# Patient Record
Sex: FEMALE | Race: ASIAN | NOT HISPANIC OR LATINO | URBAN - METROPOLITAN AREA
[De-identification: names, ages, dates, MRNs, and addresses within clinical notes are randomized per-mention and may not be internally consistent; named-entity substitution may affect disease eponyms.]

---

## 2018-06-08 ENCOUNTER — OUTPATIENT (OUTPATIENT)
Dept: OUTPATIENT SERVICES | Facility: HOSPITAL | Age: 78
LOS: 1 days | End: 2018-06-08
Payer: MEDICARE

## 2018-06-08 VITALS
DIASTOLIC BLOOD PRESSURE: 82 MMHG | WEIGHT: 108.91 LBS | OXYGEN SATURATION: 97 % | HEART RATE: 77 BPM | TEMPERATURE: 98 F | RESPIRATION RATE: 16 BRPM | SYSTOLIC BLOOD PRESSURE: 137 MMHG

## 2018-06-08 DIAGNOSIS — M65.332 TRIGGER FINGER, LEFT MIDDLE FINGER: ICD-10-CM

## 2018-06-08 DIAGNOSIS — Z01.818 ENCOUNTER FOR OTHER PREPROCEDURAL EXAMINATION: ICD-10-CM

## 2018-06-08 DIAGNOSIS — Z98.890 OTHER SPECIFIED POSTPROCEDURAL STATES: Chronic | ICD-10-CM

## 2018-06-08 DIAGNOSIS — Z98.891 HISTORY OF UTERINE SCAR FROM PREVIOUS SURGERY: Chronic | ICD-10-CM

## 2018-06-08 DIAGNOSIS — Z90.710 ACQUIRED ABSENCE OF BOTH CERVIX AND UTERUS: Chronic | ICD-10-CM

## 2018-06-08 DIAGNOSIS — Z90.49 ACQUIRED ABSENCE OF OTHER SPECIFIED PARTS OF DIGESTIVE TRACT: Chronic | ICD-10-CM

## 2018-06-08 PROCEDURE — G0463: CPT

## 2018-06-08 NOTE — H&P PST ADULT - PROBLEM SELECTOR PLAN 2
Medical clearance needed and received in chart. CBC, Comprehensive panel and EKG ordered. Pre-op instructions and surgical scrubs given and pt and pt's daughter verbalized understanding.

## 2018-06-08 NOTE — H&P PST ADULT - MUSCULOSKELETAL COMMENTS
Left hand middle finger - decreased ROM, (+) tenderness to palpation, (+) decreased strength, no edema, no erythema See HPI

## 2018-06-08 NOTE — H&P PST ADULT - PSH
History of colonoscopy    S/P appendectomy  ()  S/P  section  () History of colonoscopy    S/P appendectomy  ()  S/P  section  ()  S/P dilatation and curettage  (TOP, )  S/P hysterectomy  (TAHBSO, 1988)  S/P lumbar discectomy  ()  S/P trigger finger release  (Right middle finger, )

## 2018-06-08 NOTE — H&P PST ADULT - NSANTHOSAYNRD_GEN_A_CORE
No. SHILA screening performed.  STOP BANG Legend: 0-2 = LOW Risk; 3-4 = INTERMEDIATE Risk; 5-8 = HIGH Risk

## 2018-06-08 NOTE — H&P PST ADULT - NEGATIVE CARDIOVASCULAR SYMPTOMS
no claudication/no paroxysmal nocturnal dyspnea/no chest pain/no dyspnea on exertion/no palpitations/no peripheral edema/no orthopnea

## 2018-06-08 NOTE — H&P PST ADULT - HISTORY OF PRESENT ILLNESS
Pt complaining of left middle finger pain and "unable to fully stretch" for the last year. Pt has had cortisone injection x 3, last in 4/2018.  Pt rates left middle finger pain to be 7/10. 76yo female with PMH of HTN here for PST. Pt complaining of left middle finger pain and "unable to fully stretch" for the last year. Pt has had cortisone injection x 3, last in 4/2018.  Pt rates left middle finger pain to be 7/10 but pt denies taking any po analgesia. Pt denies left hand neuralgia. Pt electing for release left long trigger finger on 6/13/18. 78yo female with PMH of HTN here for PST. Pt complaining of left middle finger pain and "unable to fully stretch finger" for the last year. Pt has had cortisone injection x 3, last in 4/2018.  Pt rates left middle finger pain to be 7/10 but pt denies taking any po analgesia. Pt denies left hand neuralgia. Pt electing for release left long trigger finger on 6/13/18.

## 2018-06-08 NOTE — H&P PST ADULT - RS GEN PE MLT RESP DETAILS PC
normal/breath sounds equal/good air movement/respirations non-labored/clear to auscultation bilaterally/airway patent

## 2018-06-08 NOTE — H&P PST ADULT - SOURCE OF INFORMATION, PROFILE
patient family/Pt is mandarin speaking and refusing translation service,. Pt requesting daughter Toni to translate for her./patient

## 2018-06-12 ENCOUNTER — TRANSCRIPTION ENCOUNTER (OUTPATIENT)
Age: 78
End: 2018-06-12

## 2018-06-13 ENCOUNTER — OUTPATIENT (OUTPATIENT)
Dept: OUTPATIENT SERVICES | Facility: HOSPITAL | Age: 78
LOS: 1 days | End: 2018-06-13
Payer: MEDICARE

## 2018-06-13 VITALS
HEART RATE: 71 BPM | RESPIRATION RATE: 14 BRPM | DIASTOLIC BLOOD PRESSURE: 77 MMHG | SYSTOLIC BLOOD PRESSURE: 134 MMHG | OXYGEN SATURATION: 100 %

## 2018-06-13 VITALS
OXYGEN SATURATION: 100 % | TEMPERATURE: 98 F | SYSTOLIC BLOOD PRESSURE: 158 MMHG | HEART RATE: 70 BPM | HEIGHT: 59 IN | WEIGHT: 108.91 LBS | DIASTOLIC BLOOD PRESSURE: 81 MMHG | RESPIRATION RATE: 16 BRPM

## 2018-06-13 DIAGNOSIS — Z90.49 ACQUIRED ABSENCE OF OTHER SPECIFIED PARTS OF DIGESTIVE TRACT: Chronic | ICD-10-CM

## 2018-06-13 DIAGNOSIS — Z98.890 OTHER SPECIFIED POSTPROCEDURAL STATES: Chronic | ICD-10-CM

## 2018-06-13 DIAGNOSIS — Z90.710 ACQUIRED ABSENCE OF BOTH CERVIX AND UTERUS: Chronic | ICD-10-CM

## 2018-06-13 DIAGNOSIS — Z98.891 HISTORY OF UTERINE SCAR FROM PREVIOUS SURGERY: Chronic | ICD-10-CM

## 2018-06-13 DIAGNOSIS — M65.332 TRIGGER FINGER, LEFT MIDDLE FINGER: ICD-10-CM

## 2018-06-13 PROCEDURE — 26055 INCISE FINGER TENDON SHEATH: CPT | Mod: F2

## 2018-06-13 RX ORDER — HYDROMORPHONE HYDROCHLORIDE 2 MG/ML
0.5 INJECTION INTRAMUSCULAR; INTRAVENOUS; SUBCUTANEOUS
Qty: 0 | Refills: 0 | Status: DISCONTINUED | OUTPATIENT
Start: 2018-06-13 | End: 2018-06-13

## 2018-06-13 RX ORDER — OXYCODONE HYDROCHLORIDE 5 MG/1
5 TABLET ORAL EVERY 4 HOURS
Qty: 0 | Refills: 0 | Status: DISCONTINUED | OUTPATIENT
Start: 2018-06-13 | End: 2018-06-13

## 2018-06-13 RX ORDER — SODIUM CHLORIDE 9 MG/ML
1000 INJECTION, SOLUTION INTRAVENOUS
Qty: 0 | Refills: 0 | Status: DISCONTINUED | OUTPATIENT
Start: 2018-06-13 | End: 2018-06-13

## 2018-06-13 RX ORDER — ONDANSETRON 8 MG/1
4 TABLET, FILM COATED ORAL ONCE
Qty: 0 | Refills: 0 | Status: DISCONTINUED | OUTPATIENT
Start: 2018-06-13 | End: 2018-06-13

## 2018-06-13 NOTE — ASU PATIENT PROFILE, ADULT - PSH
History of colonoscopy    S/P appendectomy  ()  S/P  section  ()  S/P dilatation and curettage  (TOP, )  S/P hysterectomy  (TAHBSO, 1988)  S/P lumbar discectomy  ()  S/P trigger finger release  (Right middle finger, )

## 2018-06-13 NOTE — ASU PATIENT PROFILE, ADULT - LANGUAGE ASSISTANCE NEEDED
via #[id number], pt requests daughter interpret/No-Patient/Caregiver offered and refused free interpretation services.

## 2018-06-13 NOTE — BRIEF OPERATIVE NOTE - PROCEDURE
<<-----Click on this checkbox to enter Procedure Trigger finger release  06/13/2018    Active  KIESHA

## 2018-06-13 NOTE — ASU DISCHARGE PLAN (ADULT/PEDIATRIC). - NOTIFY
Fever greater than 101/Numbness, tingling/Numbness, color, or temperature change to extremity Bleeding that does not stop/Pain not relieved by Medications/Persistent Nausea and Vomiting/Swelling that continues/Numbness, color, or temperature change to extremity/Fever greater than 101/Numbness, tingling

## 2018-06-13 NOTE — ASU DISCHARGE PLAN (ADULT/PEDIATRIC). - MEDICATION SUMMARY - MEDICATIONS TO TAKE
I will START or STAY ON the medications listed below when I get home from the hospital:    Percocet 5/325 oral tablet  -- 1 tab(s) by mouth every 4 to 6 hours, As Needed -for severe pain MDD:4-6 tabs   -- Caution federal law prohibits the transfer of this drug to any person other  than the person for whom it was prescribed.  May cause drowsiness.  Alcohol may intensify this effect.  Use care when operating dangerous machinery.  This prescription cannot be refilled.  This product contains acetaminophen.  Do not use  with any other product containing acetaminophen to prevent possible liver damage.  Using more of this medication than prescribed may cause serious breathing problems.    -- Indication: For severe pain    enalapril 10 mg oral tablet  -- 1 tab(s) by mouth once a day  -- Indication: For home med    pravastatin 10 mg oral tablet  -- orally once a day (at bedtime)  -- Indication: For home med

## 2023-12-15 ENCOUNTER — EMERGENCY (EMERGENCY)
Facility: HOSPITAL | Age: 83
LOS: 1 days | Discharge: HOME | End: 2023-12-15
Attending: EMERGENCY MEDICINE | Admitting: EMERGENCY MEDICINE
Payer: COMMERCIAL

## 2023-12-15 VITALS
OXYGEN SATURATION: 97 % | RESPIRATION RATE: 18 BRPM | HEART RATE: 76 BPM | DIASTOLIC BLOOD PRESSURE: 69 MMHG | TEMPERATURE: 99 F | WEIGHT: 119.93 LBS | SYSTOLIC BLOOD PRESSURE: 125 MMHG

## 2023-12-15 VITALS
RESPIRATION RATE: 18 BRPM | OXYGEN SATURATION: 97 % | DIASTOLIC BLOOD PRESSURE: 69 MMHG | SYSTOLIC BLOOD PRESSURE: 109 MMHG | TEMPERATURE: 98 F | HEART RATE: 78 BPM

## 2023-12-15 DIAGNOSIS — Z90.710 ACQUIRED ABSENCE OF BOTH CERVIX AND UTERUS: Chronic | ICD-10-CM

## 2023-12-15 DIAGNOSIS — Z98.890 OTHER SPECIFIED POSTPROCEDURAL STATES: Chronic | ICD-10-CM

## 2023-12-15 DIAGNOSIS — Z98.891 HISTORY OF UTERINE SCAR FROM PREVIOUS SURGERY: Chronic | ICD-10-CM

## 2023-12-15 DIAGNOSIS — Z90.49 ACQUIRED ABSENCE OF OTHER SPECIFIED PARTS OF DIGESTIVE TRACT: Chronic | ICD-10-CM

## 2023-12-15 PROBLEM — I10 ESSENTIAL (PRIMARY) HYPERTENSION: Chronic | Status: ACTIVE | Noted: 2018-06-08

## 2023-12-15 PROBLEM — M65.332 TRIGGER FINGER, LEFT MIDDLE FINGER: Chronic | Status: ACTIVE | Noted: 2018-06-08

## 2023-12-15 PROBLEM — E78.5 HYPERLIPIDEMIA, UNSPECIFIED: Chronic | Status: ACTIVE | Noted: 2018-06-08

## 2023-12-15 LAB
ALBUMIN SERPL ELPH-MCNC: 3.3 G/DL — SIGNIFICANT CHANGE UP (ref 3.3–5)
ALBUMIN SERPL ELPH-MCNC: 3.3 G/DL — SIGNIFICANT CHANGE UP (ref 3.3–5)
ALP SERPL-CCNC: 59 U/L — SIGNIFICANT CHANGE UP (ref 40–120)
ALP SERPL-CCNC: 59 U/L — SIGNIFICANT CHANGE UP (ref 40–120)
ALT FLD-CCNC: 21 U/L — SIGNIFICANT CHANGE UP (ref 12–78)
ALT FLD-CCNC: 21 U/L — SIGNIFICANT CHANGE UP (ref 12–78)
ANION GAP SERPL CALC-SCNC: 8 MMOL/L — SIGNIFICANT CHANGE UP (ref 5–17)
ANION GAP SERPL CALC-SCNC: 8 MMOL/L — SIGNIFICANT CHANGE UP (ref 5–17)
AST SERPL-CCNC: 21 U/L — SIGNIFICANT CHANGE UP (ref 15–37)
AST SERPL-CCNC: 21 U/L — SIGNIFICANT CHANGE UP (ref 15–37)
BASOPHILS # BLD AUTO: 0.02 K/UL — SIGNIFICANT CHANGE UP (ref 0–0.2)
BASOPHILS # BLD AUTO: 0.02 K/UL — SIGNIFICANT CHANGE UP (ref 0–0.2)
BASOPHILS NFR BLD AUTO: 0.5 % — SIGNIFICANT CHANGE UP (ref 0–2)
BASOPHILS NFR BLD AUTO: 0.5 % — SIGNIFICANT CHANGE UP (ref 0–2)
BILIRUB SERPL-MCNC: 0.6 MG/DL — SIGNIFICANT CHANGE UP (ref 0.2–1.2)
BILIRUB SERPL-MCNC: 0.6 MG/DL — SIGNIFICANT CHANGE UP (ref 0.2–1.2)
BUN SERPL-MCNC: 24 MG/DL — HIGH (ref 7–23)
BUN SERPL-MCNC: 24 MG/DL — HIGH (ref 7–23)
CALCIUM SERPL-MCNC: 8.7 MG/DL — SIGNIFICANT CHANGE UP (ref 8.5–10.1)
CALCIUM SERPL-MCNC: 8.7 MG/DL — SIGNIFICANT CHANGE UP (ref 8.5–10.1)
CHLORIDE SERPL-SCNC: 106 MMOL/L — SIGNIFICANT CHANGE UP (ref 96–108)
CHLORIDE SERPL-SCNC: 106 MMOL/L — SIGNIFICANT CHANGE UP (ref 96–108)
CO2 SERPL-SCNC: 28 MMOL/L — SIGNIFICANT CHANGE UP (ref 22–31)
CO2 SERPL-SCNC: 28 MMOL/L — SIGNIFICANT CHANGE UP (ref 22–31)
CREAT SERPL-MCNC: 0.72 MG/DL — SIGNIFICANT CHANGE UP (ref 0.5–1.3)
CREAT SERPL-MCNC: 0.72 MG/DL — SIGNIFICANT CHANGE UP (ref 0.5–1.3)
EGFR: 83 ML/MIN/1.73M2 — SIGNIFICANT CHANGE UP
EGFR: 83 ML/MIN/1.73M2 — SIGNIFICANT CHANGE UP
EOSINOPHIL # BLD AUTO: 0.07 K/UL — SIGNIFICANT CHANGE UP (ref 0–0.5)
EOSINOPHIL # BLD AUTO: 0.07 K/UL — SIGNIFICANT CHANGE UP (ref 0–0.5)
EOSINOPHIL NFR BLD AUTO: 1.7 % — SIGNIFICANT CHANGE UP (ref 0–6)
EOSINOPHIL NFR BLD AUTO: 1.7 % — SIGNIFICANT CHANGE UP (ref 0–6)
GLUCOSE SERPL-MCNC: 209 MG/DL — HIGH (ref 70–99)
GLUCOSE SERPL-MCNC: 209 MG/DL — HIGH (ref 70–99)
HCT VFR BLD CALC: 37.6 % — SIGNIFICANT CHANGE UP (ref 34.5–45)
HCT VFR BLD CALC: 37.6 % — SIGNIFICANT CHANGE UP (ref 34.5–45)
HGB BLD-MCNC: 12.3 G/DL — SIGNIFICANT CHANGE UP (ref 11.5–15.5)
HGB BLD-MCNC: 12.3 G/DL — SIGNIFICANT CHANGE UP (ref 11.5–15.5)
IMM GRANULOCYTES NFR BLD AUTO: 0 % — SIGNIFICANT CHANGE UP (ref 0–0.9)
IMM GRANULOCYTES NFR BLD AUTO: 0 % — SIGNIFICANT CHANGE UP (ref 0–0.9)
LIDOCAIN IGE QN: 31 U/L — SIGNIFICANT CHANGE UP (ref 13–75)
LIDOCAIN IGE QN: 31 U/L — SIGNIFICANT CHANGE UP (ref 13–75)
LYMPHOCYTES # BLD AUTO: 1.48 K/UL — SIGNIFICANT CHANGE UP (ref 1–3.3)
LYMPHOCYTES # BLD AUTO: 1.48 K/UL — SIGNIFICANT CHANGE UP (ref 1–3.3)
LYMPHOCYTES # BLD AUTO: 36.2 % — SIGNIFICANT CHANGE UP (ref 13–44)
LYMPHOCYTES # BLD AUTO: 36.2 % — SIGNIFICANT CHANGE UP (ref 13–44)
MAGNESIUM SERPL-MCNC: 2.1 MG/DL — SIGNIFICANT CHANGE UP (ref 1.6–2.6)
MAGNESIUM SERPL-MCNC: 2.1 MG/DL — SIGNIFICANT CHANGE UP (ref 1.6–2.6)
MCHC RBC-ENTMCNC: 30.4 PG — SIGNIFICANT CHANGE UP (ref 27–34)
MCHC RBC-ENTMCNC: 30.4 PG — SIGNIFICANT CHANGE UP (ref 27–34)
MCHC RBC-ENTMCNC: 32.7 GM/DL — SIGNIFICANT CHANGE UP (ref 32–36)
MCHC RBC-ENTMCNC: 32.7 GM/DL — SIGNIFICANT CHANGE UP (ref 32–36)
MCV RBC AUTO: 92.8 FL — SIGNIFICANT CHANGE UP (ref 80–100)
MCV RBC AUTO: 92.8 FL — SIGNIFICANT CHANGE UP (ref 80–100)
MONOCYTES # BLD AUTO: 0.17 K/UL — SIGNIFICANT CHANGE UP (ref 0–0.9)
MONOCYTES # BLD AUTO: 0.17 K/UL — SIGNIFICANT CHANGE UP (ref 0–0.9)
MONOCYTES NFR BLD AUTO: 4.2 % — SIGNIFICANT CHANGE UP (ref 2–14)
MONOCYTES NFR BLD AUTO: 4.2 % — SIGNIFICANT CHANGE UP (ref 2–14)
NEUTROPHILS # BLD AUTO: 2.35 K/UL — SIGNIFICANT CHANGE UP (ref 1.8–7.4)
NEUTROPHILS # BLD AUTO: 2.35 K/UL — SIGNIFICANT CHANGE UP (ref 1.8–7.4)
NEUTROPHILS NFR BLD AUTO: 57.4 % — SIGNIFICANT CHANGE UP (ref 43–77)
NEUTROPHILS NFR BLD AUTO: 57.4 % — SIGNIFICANT CHANGE UP (ref 43–77)
NRBC # BLD: 0 /100 WBCS — SIGNIFICANT CHANGE UP (ref 0–0)
NRBC # BLD: 0 /100 WBCS — SIGNIFICANT CHANGE UP (ref 0–0)
PLATELET # BLD AUTO: 124 K/UL — LOW (ref 150–400)
PLATELET # BLD AUTO: 124 K/UL — LOW (ref 150–400)
POTASSIUM SERPL-MCNC: 4.2 MMOL/L — SIGNIFICANT CHANGE UP (ref 3.5–5.3)
POTASSIUM SERPL-MCNC: 4.2 MMOL/L — SIGNIFICANT CHANGE UP (ref 3.5–5.3)
POTASSIUM SERPL-SCNC: 4.2 MMOL/L — SIGNIFICANT CHANGE UP (ref 3.5–5.3)
POTASSIUM SERPL-SCNC: 4.2 MMOL/L — SIGNIFICANT CHANGE UP (ref 3.5–5.3)
PROT SERPL-MCNC: 7.1 G/DL — SIGNIFICANT CHANGE UP (ref 6–8.3)
PROT SERPL-MCNC: 7.1 G/DL — SIGNIFICANT CHANGE UP (ref 6–8.3)
RBC # BLD: 4.05 M/UL — SIGNIFICANT CHANGE UP (ref 3.8–5.2)
RBC # BLD: 4.05 M/UL — SIGNIFICANT CHANGE UP (ref 3.8–5.2)
RBC # FLD: 13.3 % — SIGNIFICANT CHANGE UP (ref 10.3–14.5)
RBC # FLD: 13.3 % — SIGNIFICANT CHANGE UP (ref 10.3–14.5)
SODIUM SERPL-SCNC: 142 MMOL/L — SIGNIFICANT CHANGE UP (ref 135–145)
SODIUM SERPL-SCNC: 142 MMOL/L — SIGNIFICANT CHANGE UP (ref 135–145)
TROPONIN I, HIGH SENSITIVITY RESULT: 475.2 NG/L — HIGH
TROPONIN I, HIGH SENSITIVITY RESULT: 475.2 NG/L — HIGH
TROPONIN I, HIGH SENSITIVITY RESULT: 526 NG/L — HIGH
TROPONIN I, HIGH SENSITIVITY RESULT: 526 NG/L — HIGH
TROPONIN I, HIGH SENSITIVITY RESULT: 56.2 NG/L — HIGH
TROPONIN I, HIGH SENSITIVITY RESULT: 56.2 NG/L — HIGH
TSH SERPL-MCNC: 3.86 UIU/ML — HIGH (ref 0.36–3.74)
TSH SERPL-MCNC: 3.86 UIU/ML — HIGH (ref 0.36–3.74)
WBC # BLD: 4.09 K/UL — SIGNIFICANT CHANGE UP (ref 3.8–10.5)
WBC # BLD: 4.09 K/UL — SIGNIFICANT CHANGE UP (ref 3.8–10.5)
WBC # FLD AUTO: 4.09 K/UL — SIGNIFICANT CHANGE UP (ref 3.8–10.5)
WBC # FLD AUTO: 4.09 K/UL — SIGNIFICANT CHANGE UP (ref 3.8–10.5)

## 2023-12-15 PROCEDURE — 83735 ASSAY OF MAGNESIUM: CPT

## 2023-12-15 PROCEDURE — 93010 ELECTROCARDIOGRAM REPORT: CPT | Mod: 76

## 2023-12-15 PROCEDURE — 99285 EMERGENCY DEPT VISIT HI MDM: CPT | Mod: 25

## 2023-12-15 PROCEDURE — 99285 EMERGENCY DEPT VISIT HI MDM: CPT

## 2023-12-15 PROCEDURE — 99053 MED SERV 10PM-8AM 24 HR FAC: CPT

## 2023-12-15 PROCEDURE — 85025 COMPLETE CBC W/AUTO DIFF WBC: CPT

## 2023-12-15 PROCEDURE — 71045 X-RAY EXAM CHEST 1 VIEW: CPT

## 2023-12-15 PROCEDURE — 71045 X-RAY EXAM CHEST 1 VIEW: CPT | Mod: 26

## 2023-12-15 PROCEDURE — 80053 COMPREHEN METABOLIC PANEL: CPT

## 2023-12-15 PROCEDURE — 84443 ASSAY THYROID STIM HORMONE: CPT

## 2023-12-15 PROCEDURE — 83690 ASSAY OF LIPASE: CPT

## 2023-12-15 PROCEDURE — 36415 COLL VENOUS BLD VENIPUNCTURE: CPT

## 2023-12-15 PROCEDURE — 84484 ASSAY OF TROPONIN QUANT: CPT

## 2023-12-15 PROCEDURE — 93005 ELECTROCARDIOGRAM TRACING: CPT

## 2023-12-15 RX ORDER — SODIUM CHLORIDE 9 MG/ML
1000 INJECTION INTRAMUSCULAR; INTRAVENOUS; SUBCUTANEOUS ONCE
Refills: 0 | Status: COMPLETED | OUTPATIENT
Start: 2023-12-15 | End: 2023-12-15

## 2023-12-15 RX ORDER — ASPIRIN/CALCIUM CARB/MAGNESIUM 324 MG
324 TABLET ORAL ONCE
Refills: 0 | Status: COMPLETED | OUTPATIENT
Start: 2023-12-15 | End: 2023-12-15

## 2023-12-15 RX ORDER — ASPIRIN/CALCIUM CARB/MAGNESIUM 324 MG
1 TABLET ORAL
Qty: 30 | Refills: 0
Start: 2023-12-15 | End: 2024-01-13

## 2023-12-15 RX ORDER — ONDANSETRON 8 MG/1
4 TABLET, FILM COATED ORAL ONCE
Refills: 0 | Status: DISCONTINUED | OUTPATIENT
Start: 2023-12-15 | End: 2023-12-15

## 2023-12-15 RX ORDER — METOPROLOL TARTRATE 50 MG
1 TABLET ORAL
Qty: 30 | Refills: 0
Start: 2023-12-15 | End: 2024-01-13

## 2023-12-15 RX ADMIN — Medication 324 MILLIGRAM(S): at 11:56

## 2023-12-15 RX ADMIN — SODIUM CHLORIDE 1000 MILLILITER(S): 9 INJECTION INTRAMUSCULAR; INTRAVENOUS; SUBCUTANEOUS at 08:59

## 2023-12-15 NOTE — ED PROVIDER NOTE - WR ORDER NAME 1
seen by podiatry advised mri abx admission lactate elevated fluids pending
Xray Chest 1 View- PORTABLE-Urgent

## 2023-12-15 NOTE — ED PROVIDER NOTE - CLINICAL SUMMARY MEDICAL DECISION MAKING FREE TEXT BOX
pt likely w episode of arrythmia, now w normal EKG, pt likely w episode of arrythmia, now w normal EKG, trop elevated, seen by Reena, signed out for follow up of troponins

## 2023-12-15 NOTE — ED ADULT NURSE NOTE - OBJECTIVE STATEMENT
82y/o female A&ox4, ambulatory received in rm3a. pt c/o chest pain, elevated HR,, n/v. nsr on cardiac monitor. respirations even and unlabored. denies dizziness, lightheadedness, sob. 20g iv placed in RAC, labs sent. md at bedside for eval. bed in lowest position, side rails up, call bell in hand, safety maintained. awaiting further orders. will continue to monitor. 84y/o female A&ox4, ambulatory received in rm3a. pt c/o chest pain, elevated HR,, n/v. nsr on cardiac monitor. respirations even and unlabored. denies dizziness, lightheadedness, sob. 20g iv placed in RAC, labs sent. md at bedside for eval. bed in lowest position, side rails up, call bell in hand, safety maintained. awaiting further orders. will continue to monitor.

## 2023-12-15 NOTE — ED PROVIDER NOTE - NSFOLLOWUPINSTRUCTIONS_ED_ALL_ED_FT
-- You should update your primary care physician on your Emergency Department visit and follow up with them.  If you do not have a physician or have difficulty following up, please call: [phone] to obtain a [facility] doctor or specialist who can provide follow up.    -- Follow up with your cardiologist    -- Stop Enalapril and start metoprolol 50XL and aspirin 81mg daily.     -- Return to the ER for worsening or persistent symptoms, and/or ANY NEW OR CONCERNING SYMPTOMS. -- You should update your primary care physician on your Emergency Department visit and follow up with them.  If you do not have a physician or have difficulty following up, please call: [phone] to obtain a [facility] doctor or specialist who can provide follow up.    -- Follow up with your cardiologist    -- Stop Enalapril and start metoprolol 50XL and aspirin 81mg daily.     -- Return to the ER for worsening or persistent symptoms, and/or ANY NEW OR CONCERNING SYMPTOMS.

## 2023-12-15 NOTE — ED PROVIDER NOTE - NSICDXPASTSURGICALHX_GEN_ALL_CORE_FT
PAST SURGICAL HISTORY:  History of colonoscopy     S/P appendectomy ()    S/P  section ()    S/P dilatation and curettage (TOP, )    S/P hysterectomy ([facility]OSO, )    S/P lumbar discectomy ()    S/P trigger finger release (Right middle finger, )

## 2023-12-15 NOTE — ED PROVIDER NOTE - PROGRESS NOTE DETAILS
Discussed with Dr. Valles regarding elevation in troponin compared with second troponin.  Okay to discharge home, outpatient follow-up given not significant delta increase. At length discussion with patient regarding nature of the patient's symptoms. Discussed results of all diagnostic testing in ED. Discussed chest pain, Shortness of breath, Dizziness, syncope /  near syncope precautions and instructions. Patient demonstrates understanding that a cardiac cause of the symptoms has not been totally excluded, but at this time there is no evidence to warrant an inpatient workup.  Discussed the importance of continued follow-up with Cardiology as outpatient to complete workup. Received signout, discussed with Dr. Valles after repeat troponin, dispo as per cardiology.

## 2023-12-15 NOTE — ED PROVIDER NOTE - CARE PROVIDER_API CALL
Sonny Valles  Interventional Cardiology  [address] [place] [address]  Phone: [phone]  Fax: [phone]  Follow Up Time:    Sonny Valles  Interventional Cardiology  [address] [place] [address]  Phone: [phone]  Fax: [phone]  Follow Up Time:

## 2023-12-15 NOTE — ED PROVIDER NOTE - INTERPRETATION
normal sinus rhythm, Normal axis, Normal TX interval and QRS complex. There are no acute ischemic ST or T-wave changes. normal sinus rhythm, Normal axis, Normal WI interval and QRS complex. There are no acute ischemic ST or T-wave changes.

## 2023-12-15 NOTE — ED PROVIDER NOTE - PATIENT PORTAL LINK FT
You can access the FollowMyHealth Patient Portal offered by [facility] by registering at the following website: [email or web address]. By joining [facility] [employer]’s FollowMyHealth portal, you will also be able to view your health information using other applications (apps) compatible with our system. You can access the FollowMyHealth Patient Portal offered by [facility] by registering at the following website: [email or web address]. By joining [employer]’s FollowMyHealth portal, you will also be able to view your health information using other applications (apps) compatible with our system.

## 2023-12-15 NOTE — ED ADULT NURSE NOTE - NSFALLUNIVINTERV_ED_ALL_ED
Bed/Stretcher in lowest position, wheels locked, appropriate side rails in place/Call bell, personal items and telephone in reach/Instruct patient to call for assistance before getting out of bed/chair/stretcher/Non-slip footwear applied when patient is off stretcher/[place] to call system/Physically safe environment - no spills, clutter or unnecessary equipment/Purposeful proactive rounding/Room/bathroom lighting operational, light cord in reach Bed/Stretcher in lowest position, wheels locked, appropriate side rails in place/Call bell, personal items and telephone in reach/Instruct patient to call for assistance before getting out of bed/chair/stretcher/Non-slip footwear applied when patient is off stretcher/[place] to call system/Physically safe environment - no spills, clutter or unnecessary equipment/Purposeful proactive rounding/Room/bathroom lighting operational, light cord in reach

## 2023-12-15 NOTE — CONSULT NOTE ADULT - SUBJECTIVE AND OBJECTIVE BOX
[facility]    Aretha Valles Patel, Savella, Alvarez, Elicia, Inderjit       [phone] (office)    Reason for Consult: Elevated trop/palpitations      HPI: 82 yo female PMHx HTN, HLD presents to the ED for 90 minute episode of palpitations this AM with associated nausea, vomiting, and dizziness. States that she checked her HR at that time and it was reading 150s. BP was noted to 89/61. Family brought her to the ED for further evaluation. States that pt has been having similar episodes with increased frequency. Last episode being at the beginning of this month. Pt states that she usually would wait for the episode to subside; however, today's episode was longer than usual.       PAST MEDICAL & SURGICAL HISTORY:  Hypertension      Hyperlipidemia      Trigger finger, left middle finger      S/P  section  ()      S/P appendectomy  ()      History of colonoscopy      S/P dilatation and curettage  (TOP, )      S/P hysterectomy  (TAHBSO, )      S/P lumbar discectomy  ()      S/P trigger finger release  (Right middle finger, )          SOCIAL HISTORY: No active tobacco, alcohol or illicit drug use    FAMILY HISTORY:  Family history of prostate cancer (Father)        Home Medications:  enalapril 10 mg oral tablet: 1 tab(s) orally once a day (2018 16:20)  pravastatin 10 mg oral tablet: orally once a day (at bedtime) (2018 16:20)      MEDICATIONS  (STANDING):    MEDICATIONS  (PRN):      Allergies    amoxicillin (Unknown)  Bactrim (Unknown)    Intolerances        REVIEW OF SYSTEMS: Negative except as per HPI.    VITAL SIGNS:   Vital Signs Last 24 Hrs  T(C): 37 (15 Dec 2023 08:04), Max: 37 (15 Dec 2023 08:04)  T(F): 98.6 (15 Dec 2023 08:04), Max: 98.6 (15 Dec 2023 08:04)  HR: 76 (15 Dec 2023 08:04) (76 - 76)  BP: 125/69 (15 Dec 2023 08:04) (125/69 - 125/69)  BP(mean): --  RR: 18 (15 Dec 2023 08:04) (18 - 18)  SpO2: 97% (15 Dec 2023 08:04) (97% - 97%)    Parameters below as of 15 Dec 2023 08:04  Patient On (Oxygen Delivery Method): room air        I&O's Summary      PHYSICAL EXAM:  Constitutional: NAD, well-developed  HEENT NC/AT, moist mucous membranes  Pulmonary: Non-labored, breath sounds are clear bilaterally, no wheezing, rales or rhonchi  Cardiovascular: +S1, S2, RRR, no murmur  Gastrointestinal: Soft, nontender, nondistended, normoactive bowel sounds  Extremities: No peripheral edema   Neurological: Alert, strength and sensitivity are grossly intact  Skin: No obvious lesions/rashes  Psych: Mood & affect appropriate    LABS: All Labs Reviewed:                        12.3   4.09  )-----------( 124      ( 15 Dec 2023 08:26 )             37.6     15 Dec 2023 08:26    142    |  106    |  24     ----------------------------<  209    4.2     |  28     |  0.72     Ca    8.7        15 Dec 2023 08:26  Mg     2.1       15 Dec 2023 08:26    TPro  7.1    /  Alb  3.3    /  TBili  0.6    /  DBili  x      /  AST  21     /  ALT  21     /  AlkPhos  59     15 Dec 2023 08:26          Blood Culture:     -15 @ 08:26  TSH: 3.86      EKG: NSR with prolonged QT     RADIOLOGY: N/a   [facility]    Aretha Valles Patel, Savella, Alvarez, Elicia, Inderjit       [phone] (office)    Reason for Consult: Elevated trop/palpitations      HPI: 84 yo female PMHx HTN, HLD presents to the ED for 90 minute episode of palpitations this AM with associated nausea, vomiting, and dizziness. States that she checked her HR at that time and it was reading 150s. BP was noted to 89/61. Family brought her to the ED for further evaluation. States that pt has been having similar episodes with increased frequency. Last episode being at the beginning of this month. Pt states that she usually would wait for the episode to subside; however, today's episode was longer than usual.       PAST MEDICAL & SURGICAL HISTORY:  Hypertension      Hyperlipidemia      Trigger finger, left middle finger      S/P  section  ()      S/P appendectomy  ()      History of colonoscopy      S/P dilatation and curettage  (TOP, )      S/P hysterectomy  (TAHBSO, )      S/P lumbar discectomy  ()      S/P trigger finger release  (Right middle finger, )          SOCIAL HISTORY: No active tobacco, alcohol or illicit drug use    FAMILY HISTORY:  Family history of prostate cancer (Father)        Home Medications:  enalapril 10 mg oral tablet: 1 tab(s) orally once a day (2018 16:20)  pravastatin 10 mg oral tablet: orally once a day (at bedtime) (2018 16:20)      MEDICATIONS  (STANDING):    MEDICATIONS  (PRN):      Allergies    amoxicillin (Unknown)  Bactrim (Unknown)    Intolerances        REVIEW OF SYSTEMS: Negative except as per HPI.    VITAL SIGNS:   Vital Signs Last 24 Hrs  T(C): 37 (15 Dec 2023 08:04), Max: 37 (15 Dec 2023 08:04)  T(F): 98.6 (15 Dec 2023 08:04), Max: 98.6 (15 Dec 2023 08:04)  HR: 76 (15 Dec 2023 08:04) (76 - 76)  BP: 125/69 (15 Dec 2023 08:04) (125/69 - 125/69)  BP(mean): --  RR: 18 (15 Dec 2023 08:04) (18 - 18)  SpO2: 97% (15 Dec 2023 08:04) (97% - 97%)    Parameters below as of 15 Dec 2023 08:04  Patient On (Oxygen Delivery Method): room air        I&O's Summary      PHYSICAL EXAM:  Constitutional: NAD, well-developed  HEENT NC/AT, moist mucous membranes  Pulmonary: Non-labored, breath sounds are clear bilaterally, no wheezing, rales or rhonchi  Cardiovascular: +S1, S2, RRR, no murmur  Gastrointestinal: Soft, nontender, nondistended, normoactive bowel sounds  Extremities: No peripheral edema   Neurological: Alert, strength and sensitivity are grossly intact  Skin: No obvious lesions/rashes  Psych: Mood & affect appropriate    LABS: All Labs Reviewed:                        12.3   4.09  )-----------( 124      ( 15 Dec 2023 08:26 )             37.6     15 Dec 2023 08:26    142    |  106    |  24     ----------------------------<  209    4.2     |  28     |  0.72     Ca    8.7        15 Dec 2023 08:26  Mg     2.1       15 Dec 2023 08:26    TPro  7.1    /  Alb  3.3    /  TBili  0.6    /  DBili  x      /  AST  21     /  ALT  21     /  AlkPhos  59     15 Dec 2023 08:26          Blood Culture:     -15 @ 08:26  TSH: 3.86      EKG: NSR with prolonged QT     RADIOLOGY: N/a

## 2023-12-15 NOTE — CONSULT NOTE ADULT - ATTENDING COMMENTS
82 yo female PMHx HTN, HLD presents to the ED for 90 minute episode of palpitations this AM with associated nausea, vomiting, and dizziness.     - Pt presents to ED for palpitations  - Troponin elevated 56 --> 475   - repeat trop 500s  - likely in setting of demand ischemia in the setting of sustained arrhythmia and hypotension  - EKG NSR with prolonged QT   - Pt to begin ASA 81mg qd   - Pt to follow up outpatient for 2 week extended holter monitor, nuclear stress test, TTE     - Hx of HTN  - BP well controlled, monitor routine hemodynamics.  - In setting of probable arrhythmia, dc home enalapril and begin metoprolol succinate 50mg qd     - No meaningful evidence of volume overload.    - Monitor and replete lytes, keep K>4, Mg>2.  - Other cardiovascular workup will depend on clinical course.  - All other workup per primary team.  - Will continue to follow.

## 2023-12-15 NOTE — CONSULT NOTE ADULT - ASSESSMENT
84 yo female PMHx HTN, HLD presents to the ED for 90 minute episode of palpitations this AM with associated nausea, vomiting, and dizziness.     - Pt presents to ED for palpitations  - Troponin elevated 56 --> 475   - f/u repeat trop   - likely in setting of demand ischemia  - EKG NSR with prolonged QT   - Pt to begin ASA 81mg qd   - Pt to follow up outpatient for 2 week extended holter monitor, nuclear stress test, TTE     - Hx of HTN  - BP well controlled, monitor routine hemodynamics.  - In setting of possible arrhythmia, dc home enalapril and begin metoprolol succinate 50mg qd     - No meaningful evidence of volume overload.    - Monitor and replete lytes, keep K>4, Mg>2.  - Other cardiovascular workup will depend on clinical course.  - All other workup per primary team.  - Will continue to follow. Dispo pending trop.              82 yo female PMHx HTN, HLD presents to the ED for 90 minute episode of palpitations this AM with associated nausea, vomiting, and dizziness.     - Pt presents to ED for palpitations  - Troponin elevated 56 --> 475   - f/u repeat trop   - likely in setting of demand ischemia  - EKG NSR with prolonged QT   - Pt to begin ASA 81mg qd   - Pt to follow up outpatient for 2 week extended holter monitor, nuclear stress test, TTE     - Hx of HTN  - BP well controlled, monitor routine hemodynamics.  - In setting of possible arrhythmia, dc home enalapril and begin metoprolol succinate 50mg qd     - No meaningful evidence of volume overload.    - Monitor and replete lytes, keep K>4, Mg>2.  - Other cardiovascular workup will depend on clinical course.  - All other workup per primary team.  - Will continue to follow. Dispo pending trop.

## 2023-12-26 ENCOUNTER — APPOINTMENT (OUTPATIENT)
Dept: CARDIOLOGY | Facility: CLINIC | Age: 83
End: 2023-12-26
Payer: MEDICARE

## 2023-12-26 ENCOUNTER — NON-APPOINTMENT (OUTPATIENT)
Age: 83
End: 2023-12-26

## 2023-12-26 VITALS
SYSTOLIC BLOOD PRESSURE: 155 MMHG | HEIGHT: 59 IN | HEART RATE: 89 BPM | WEIGHT: 106 LBS | OXYGEN SATURATION: 97 % | BODY MASS INDEX: 21.37 KG/M2 | DIASTOLIC BLOOD PRESSURE: 81 MMHG

## 2023-12-26 DIAGNOSIS — R79.89 OTHER SPECIFIED ABNORMAL FINDINGS OF BLOOD CHEMISTRY: ICD-10-CM

## 2023-12-26 DIAGNOSIS — R07.89 OTHER CHEST PAIN: ICD-10-CM

## 2023-12-26 PROBLEM — Z00.00 ENCOUNTER FOR PREVENTIVE HEALTH EXAMINATION: Status: ACTIVE | Noted: 2023-12-26

## 2023-12-26 PROCEDURE — 93000 ELECTROCARDIOGRAM COMPLETE: CPT

## 2023-12-26 PROCEDURE — 99215 OFFICE O/P EST HI 40 MIN: CPT

## 2023-12-26 RX ORDER — ASPIRIN ENTERIC COATED TABLETS 81 MG 81 MG/1
81 TABLET, DELAYED RELEASE ORAL
Refills: 0 | Status: ACTIVE | COMMUNITY

## 2023-12-26 RX ORDER — METOPROLOL SUCCINATE 50 MG/1
50 TABLET, EXTENDED RELEASE ORAL DAILY
Qty: 90 | Refills: 3 | Status: ACTIVE | COMMUNITY
Start: 1900-01-01 | End: 1900-01-01

## 2023-12-26 NOTE — HISTORY OF PRESENT ILLNESS
[FreeTextEntry1] : Cristhian Han presented to the office today for a follow-up evaluation.  We recently saw her in the emergency department.  She is now 83 years old, with a history of hypertension and hypercholesterolemia.  She is not known to have any underlying structural heart disease.  She presented to the emergency department in December 2023 with a 90-minute episode of palpitations, with associated nausea, vomiting and lightheadedness.  Her heart rate during the episode was in the 150/min range.  Her blood pressure was low at that time.  Her family brought her to the emergency department.    In the emergency department, she was found to be in a sinus rhythm.  She was felt to have most likely had a paroxysm of some form of SVT, with associated demand ischemia, which resulted in her troponins elevating.  I recommended that she be discharged after I made sure that her troponins were stable.  I suggested outpatient echocardiography, nuclear stress testing and an extended Holter monitor.  I changed her from enalapril to Toprol-XL 50 mg daily, and suggested adding an aspirin  She presents to the office today having been feeling well.  She reports no chest discomfort or shortness of breath suggestive of angina.  She denies orthopnea, PND and lower extremity edema.  She denies palpitations, dizziness and syncope.  She has not had any recurrent episodes of tachycardia, associated with chest discomfort, such as the one that brought her to the emergency department.  She has not taken the metoprolol that I prescribed, as [facility] priced it at $75, which was too expensive.  She has not been taking aspirin, as she was worried about bleeding.

## 2023-12-26 NOTE — PHYSICAL EXAM
[Well Developed] : well developed [Well Nourished] : well nourished [No Acute Distress] : no acute distress [Normal Conjunctiva] : normal conjunctiva [Normal Venous Pressure] : normal venous pressure [No Carotid Bruit] : no carotid bruit [Normal S1, S2] : normal S1, S2 [No Rub] : no rub [No Gallop] : no gallop [Clear Lung Fields] : clear lung fields [Good Air Entry] : good air entry [No Respiratory Distress] : no respiratory distress  [Soft] : abdomen soft [Non Tender] : non-tender [No Masses/organomegaly] : no masses/organomegaly [Normal Bowel Sounds] : normal bowel sounds [Normal Gait] : normal gait [No Edema] : no edema [No Cyanosis] : no cyanosis [No Clubbing] : no clubbing [No Varicosities] : no varicosities [No Rash] : no rash [No Skin Lesions] : no skin lesions [Moves all extremities] : moves all extremities [No Focal Deficits] : no focal deficits [Normal Speech] : normal speech [Alert and Oriented] : alert and oriented [Normal memory] : normal memory [Rhythm Regular] : regular [I] : a grade 1

## 2023-12-26 NOTE — DISCUSSION/SUMMARY
[FreeTextEntry1] : It seems relatively likely that she suffers from paroxysms of SVT, with some associated chest discomfort, possibly on the basis of ischemia.  Several examinations are necessary.  I suggested an echocardiogram.  She does have a cardiac murmur on examination, which will clarify this as well.  I suspect a degree of aortic valve stenosis, unlikely to be severe.  She will schedule a 1 week extended Holter.  She will schedule a nuclear stress test.  I will be in contact with her to discuss the results, and have asked that they return to the office in about 6 weeks to have her symptoms reevaluated.  She is agreeable to start the aspirin and the metoprolol, and I have called that into their pharmacy of choice. [EKG obtained to assist in diagnosis and management of assessed problem(s)] : EKG obtained to assist in diagnosis and management of assessed problem(s)

## 2024-01-12 ENCOUNTER — NON-APPOINTMENT (OUTPATIENT)
Age: 84
End: 2024-01-12

## 2024-01-17 ENCOUNTER — APPOINTMENT (OUTPATIENT)
Dept: CARDIOLOGY | Facility: CLINIC | Age: 84
End: 2024-01-17
Payer: MEDICARE

## 2024-01-17 PROCEDURE — A9500: CPT

## 2024-01-17 PROCEDURE — 93015 CV STRESS TEST SUPVJ I&R: CPT

## 2024-01-17 PROCEDURE — 78452 HT MUSCLE IMAGE SPECT MULT: CPT

## 2024-01-17 PROCEDURE — 93306 TTE W/DOPPLER COMPLETE: CPT

## 2024-02-14 ENCOUNTER — APPOINTMENT (OUTPATIENT)
Dept: CARDIOLOGY | Facility: CLINIC | Age: 84
End: 2024-02-14
Payer: MEDICARE

## 2024-02-14 ENCOUNTER — NON-APPOINTMENT (OUTPATIENT)
Age: 84
End: 2024-02-14

## 2024-02-14 VITALS
HEIGHT: 59 IN | DIASTOLIC BLOOD PRESSURE: 75 MMHG | OXYGEN SATURATION: 97 % | BODY MASS INDEX: 21.17 KG/M2 | SYSTOLIC BLOOD PRESSURE: 149 MMHG | HEART RATE: 70 BPM | WEIGHT: 105 LBS

## 2024-02-14 DIAGNOSIS — R00.0 TACHYCARDIA, UNSPECIFIED: ICD-10-CM

## 2024-02-14 PROCEDURE — 99214 OFFICE O/P EST MOD 30 MIN: CPT

## 2024-02-14 PROCEDURE — 93000 ELECTROCARDIOGRAM COMPLETE: CPT

## 2024-02-14 NOTE — HISTORY OF PRESENT ILLNESS
[FreeTextEntry1] : Cristhian Han presented to the office today for a follow-up evaluation.  She was last seen in the office in December, 2023.  She is now 83 years old, with a history of hypertension and hypercholesterolemia.  She is not known to have any underlying structural heart disease.  She presented to the emergency department in December 2023 with a 90-minute episode of palpitations, with associated nausea, vomiting and lightheadedness.  Her heart rate during the episode was in the 150/min range.  Her blood pressure was low at that time.  Her family brought her to the emergency department.    In the emergency department, she was found to be in a sinus rhythm.  She was felt to have most likely had a paroxysm of some form of SVT, with associated demand ischemia, which resulted in her troponins elevating.  I recommended that she be discharged after I made sure that her troponins were stable.  I suggested outpatient echocardiography, nuclear stress testing and an extended Holter monitor.  I changed her from enalapril to Toprol-XL 50 mg daily, and suggested adding an aspirin  I saw her in the office, and she was feeling well.  She had not been taking the metoprolol, given cost considerations, and she had not been taking aspirin, as there were concerns about bleeding.  I recommended that she start aspirin and metoprolol.  I recommended several examinations, including echocardiography, a 1 week extended Holter, and nuclear stress testing.  Echocardiography was performed January 17, 2024.  This revealed a normal ejection fraction, with mitral valve leaflet calcification, and mild mitral stenosis.  There was mild aortic regurgitation.  Nuclear stress testing was performed January 17, 2024.  This revealed no evidence of ischemia, and an exercise capacity of 7 METS.  A 1 week extended Holter was performed, revealing underlying sinus rhythm, with episodes of SVT.  Atrial tachycardia and atrial flutter could not be differentiated.  I recommended that her dose of metoprolol be increased, but there were concerns about bradycardia, and so they decided to wait until the office visit to discuss it.  She presents to the office today having been feeling well.  She reports no chest discomfort or shortness of breath suggestive of angina.  She denies orthopnea, PND and lower extremity edema.  She denies dizziness and syncope.  She has not had any recurrent episodes of tachycardia, associated with chest discomfort, such as the one that brought her to the emergency department.  What ever brief episodes she does have, are well-tolerated, brief, and less frequent than before.  Her heart rate and blood pressure have been well-controlled, with some outlier blood pressures in the 110s, and 150s, but with the predominant systolic blood pressures in the 120s and 130s, and diastolics in the 70s.  Her heart rates are typically in the high 50s and 60s.

## 2024-02-14 NOTE — DISCUSSION/SUMMARY
[FreeTextEntry1] : Overall, she is doing better from a cardiovascular perspective.  Her symptoms have improved overall.  Echocardiography was performed January 17, 2024.  This revealed a normal ejection fraction, with mitral valve leaflet calcification, and mild mitral stenosis.  There was mild aortic regurgitation.  Nuclear stress testing was performed January 17, 2024.  This revealed no evidence of ischemia, and an exercise capacity of 7 METS.  A 1 week extended Holter was performed, revealing underlying sinus rhythm, with episodes of SVT.  Atrial tachycardia and atrial flutter could not be differentiated.  For now, I will make no changes.  She does have a tendency toward bradycardia, and her symptoms are much more manageable than before.  She will call with any change in her symptoms, at which time we can consider a small increase in her dose of metoprolol.  I have suggested a follow-up in about 3 months. [EKG obtained to assist in diagnosis and management of assessed problem(s)] : EKG obtained to assist in diagnosis and management of assessed problem(s)

## 2024-02-14 NOTE — PHYSICAL EXAM
[Well Developed] : well developed [Well Nourished] : well nourished [No Acute Distress] : no acute distress [Normal Conjunctiva] : normal conjunctiva [Normal Venous Pressure] : normal venous pressure [No Carotid Bruit] : no carotid bruit [Normal S1, S2] : normal S1, S2 [No Rub] : no rub [No Gallop] : no gallop [Rhythm Regular] : regular [I] : a grade 1 [Clear Lung Fields] : clear lung fields [Good Air Entry] : good air entry [No Respiratory Distress] : no respiratory distress  [Soft] : abdomen soft [Non Tender] : non-tender [No Masses/organomegaly] : no masses/organomegaly [Normal Bowel Sounds] : normal bowel sounds [Normal Gait] : normal gait [No Edema] : no edema [No Cyanosis] : no cyanosis [No Clubbing] : no clubbing [No Varicosities] : no varicosities [No Rash] : no rash [No Skin Lesions] : no skin lesions [Moves all extremities] : moves all extremities [No Focal Deficits] : no focal deficits [Normal Speech] : normal speech [Alert and Oriented] : alert and oriented [Normal memory] : normal memory

## 2025-07-02 ENCOUNTER — NON-APPOINTMENT (OUTPATIENT)
Age: 85
End: 2025-07-02

## 2025-07-02 ENCOUNTER — APPOINTMENT (OUTPATIENT)
Dept: FAMILY MEDICINE | Facility: CLINIC | Age: 85
End: 2025-07-02
Payer: MEDICARE

## 2025-07-02 VITALS
TEMPERATURE: 98.3 F | WEIGHT: 106 LBS | DIASTOLIC BLOOD PRESSURE: 76 MMHG | HEIGHT: 59 IN | HEART RATE: 63 BPM | OXYGEN SATURATION: 100 % | SYSTOLIC BLOOD PRESSURE: 150 MMHG | BODY MASS INDEX: 21.37 KG/M2

## 2025-07-02 VITALS — DIASTOLIC BLOOD PRESSURE: 73 MMHG | SYSTOLIC BLOOD PRESSURE: 135 MMHG

## 2025-07-02 PROCEDURE — 93000 ELECTROCARDIOGRAM COMPLETE: CPT

## 2025-07-02 PROCEDURE — 99204 OFFICE O/P NEW MOD 45 MIN: CPT

## 2025-07-02 RX ORDER — PRAVASTATIN SODIUM 10 MG/1
10 TABLET ORAL
Qty: 30 | Refills: 3 | Status: ACTIVE | COMMUNITY

## 2025-07-02 RX ORDER — ENALAPRIL MALEATE 10 MG/1
10 TABLET ORAL DAILY
Qty: 90 | Refills: 1 | Status: ACTIVE | COMMUNITY

## 2025-07-02 RX ORDER — UBIDECARENONE/VIT E ACET 100MG-5
50 MCG CAPSULE ORAL
Qty: 30 | Refills: 0 | Status: ACTIVE | COMMUNITY
Start: 2025-07-02

## 2025-07-25 NOTE — BRIEF OPERATIVE NOTE - OPERATION/FINDINGS
Addended by: RAYRAY BRUCE on: 7/25/2025 08:30 AM     Modules accepted: Orders     trigger finger left long finger  released